# Patient Record
Sex: FEMALE | Race: BLACK OR AFRICAN AMERICAN | NOT HISPANIC OR LATINO
[De-identification: names, ages, dates, MRNs, and addresses within clinical notes are randomized per-mention and may not be internally consistent; named-entity substitution may affect disease eponyms.]

---

## 2021-09-25 ENCOUNTER — TRANSCRIPTION ENCOUNTER (OUTPATIENT)
Age: 56
End: 2021-09-25

## 2022-10-02 ENCOUNTER — NON-APPOINTMENT (OUTPATIENT)
Age: 57
End: 2022-10-02

## 2022-10-30 ENCOUNTER — FORM ENCOUNTER (OUTPATIENT)
Age: 57
End: 2022-10-30

## 2022-10-31 ENCOUNTER — APPOINTMENT (OUTPATIENT)
Dept: MRI IMAGING | Facility: CLINIC | Age: 57
End: 2022-10-31

## 2022-10-31 ENCOUNTER — APPOINTMENT (OUTPATIENT)
Dept: ORTHOPEDIC SURGERY | Facility: CLINIC | Age: 57
End: 2022-10-31

## 2022-10-31 VITALS — BODY MASS INDEX: 21.05 KG/M2 | WEIGHT: 131 LBS | HEIGHT: 66 IN

## 2022-10-31 PROBLEM — Z00.00 ENCOUNTER FOR PREVENTIVE HEALTH EXAMINATION: Status: ACTIVE | Noted: 2022-10-31

## 2022-10-31 PROCEDURE — 73221 MRI JOINT UPR EXTREM W/O DYE: CPT | Mod: LT

## 2022-10-31 PROCEDURE — 99203 OFFICE O/P NEW LOW 30 MIN: CPT

## 2022-10-31 NOTE — DATA REVIEWED
[Outside X-rays] : outside x-rays [Left] : left [Wrist] : wrist [I independently reviewed and interpreted images and report] : I independently reviewed and interpreted images and report [FreeTextEntry1] : No acute fractures or dislocations

## 2022-10-31 NOTE — HISTORY OF PRESENT ILLNESS
[de-identified] : 10/31/22:  Pt fell and now has left wrist pain and swelling.  Pt now has left shoulder pain also. [FreeTextEntry1] : Lt hand

## 2022-10-31 NOTE — IMAGING
[de-identified] : left wrist:\par mild swelling and ecchymosis\par ttp over scaphoid\par farom\par nvid\par \par left shoulder:\par pain with internal rotation\par negative impingement\par no deformity\par

## 2022-11-02 ENCOUNTER — APPOINTMENT (OUTPATIENT)
Dept: ORTHOPEDIC SURGERY | Facility: CLINIC | Age: 57
End: 2022-11-02

## 2022-11-02 VITALS — BODY MASS INDEX: 21.05 KG/M2 | WEIGHT: 131 LBS | HEIGHT: 66 IN

## 2022-11-02 DIAGNOSIS — Z78.9 OTHER SPECIFIED HEALTH STATUS: ICD-10-CM

## 2022-11-02 DIAGNOSIS — I10 ESSENTIAL (PRIMARY) HYPERTENSION: ICD-10-CM

## 2022-11-02 PROCEDURE — 25600 CLTX DST RDL FX/EPHYS SEP WO: CPT

## 2022-11-02 PROCEDURE — 99214 OFFICE O/P EST MOD 30 MIN: CPT | Mod: 25

## 2022-11-02 NOTE — ASSESSMENT
[FreeTextEntry1] : Pt will maintain spica brace x 6 weeks.\par RTO in 2 weeks for xray and exanimation of the left wrist.\par

## 2022-11-02 NOTE — IMAGING
[de-identified] : left wrist:\par mild swelling and ecchymosis\par ttp over DR and SL region\par farom\par nvid\par \par left shoulder:\par pain with internal rotation\par negative impingement\par no deformity\par

## 2022-11-02 NOTE — HISTORY OF PRESENT ILLNESS
[de-identified] : 11/2/2022: pt here s/p left wrist MRI which shows: \par 1. Findings consistent with an acute nondisplaced fracture of the distal radius and probable contusions in the \par proximal to mid dorsal scaphoid and dorsal proximal capitate with full-thickness scapholunate ligament tearing, \par effusion, synovitis and dorsal radial extensor tenosynovitis without gross malalignment or tendon discontinuity.\par 2. Moderate TFCC sprain with moderate DRUJ effusion and synovitis and radiocarpal effusion and synovitis \par with soft tissue swelling in the dorsum of the wrist extending into the hand.\par 3. Moderate chronic appearing first CMC arthrosis.\par 4. Patient motion degrades image quality on multiple sequences.\par \par MRI reviewed with Ms. Zavala and she will maintain spica brace x 6 weeks. \par \par 10/31/22:  Pt fell and now has left wrist pain and swelling.  Pt now has left shoulder pain also. [FreeTextEntry1] : Lt hand [FreeTextEntry5] : Pt is here for MRI results, she reports there are no changes since last visit.

## 2022-11-16 ENCOUNTER — APPOINTMENT (OUTPATIENT)
Dept: ORTHOPEDIC SURGERY | Facility: CLINIC | Age: 57
End: 2022-11-16

## 2022-11-16 VITALS — HEIGHT: 66 IN | WEIGHT: 131 LBS | BODY MASS INDEX: 21.05 KG/M2

## 2022-11-16 PROCEDURE — 99024 POSTOP FOLLOW-UP VISIT: CPT

## 2022-11-16 PROCEDURE — 73110 X-RAY EXAM OF WRIST: CPT | Mod: LT

## 2022-11-16 NOTE — IMAGING
[de-identified] : left wrist:\par no swelling and ecchymosis\par mild ttp over DR and SL region\par stiffness\par nvid\par \par left shoulder:\par pain with internal rotation\par negative impingement\par no deformity\par  [Left] : left wrist [The fracture is in acceptable alignment. There is progression in healing seen] : The fracture is in acceptable alignment. There is progression in healing seen

## 2022-11-16 NOTE — HISTORY OF PRESENT ILLNESS
[3] : 3 [Radiating] : radiating [] : yes [de-identified] : 11/16/22:  Pt has been wearing forearm based thumb spica and is improving.\par \par 11/2/2022: pt here s/p left wrist MRI which shows: \par 1. Findings consistent with an acute nondisplaced fracture of the distal radius and probable contusions in the \par proximal to mid dorsal scaphoid and dorsal proximal capitate with full-thickness scapholunate ligament tearing, \par effusion, synovitis and dorsal radial extensor tenosynovitis without gross malalignment or tendon discontinuity.\par 2. Moderate TFCC sprain with moderate DRUJ effusion and synovitis and radiocarpal effusion and synovitis \par with soft tissue swelling in the dorsum of the wrist extending into the hand.\par 3. Moderate chronic appearing first CMC arthrosis.\par 4. Patient motion degrades image quality on multiple sequences.\par \par MRI reviewed with Ms. Zavala and she will maintain spica brace x 6 weeks. \par \par 10/31/22:  Pt fell and now has left wrist pain and swelling.  Pt now has left shoulder pain also. [FreeTextEntry1] : left wrist  [FreeTextEntry7] : into the thumb

## 2022-11-16 NOTE — ASSESSMENT
[FreeTextEntry1] : Pt will maintain spica brace for 4 more weeks.\par RTO in 4 weeks for xray and exanimation of the left wrist.\par

## 2022-12-14 ENCOUNTER — APPOINTMENT (OUTPATIENT)
Dept: ORTHOPEDIC SURGERY | Facility: CLINIC | Age: 57
End: 2022-12-14
Payer: COMMERCIAL

## 2022-12-14 VITALS — WEIGHT: 131 LBS | BODY MASS INDEX: 21.05 KG/M2 | HEIGHT: 66 IN

## 2022-12-14 DIAGNOSIS — M75.102 UNSPECIFIED ROTATOR CUFF TEAR OR RUPTURE OF LEFT SHOULDER, NOT SPECIFIED AS TRAUMATIC: ICD-10-CM

## 2022-12-14 DIAGNOSIS — S62.002A UNSPECIFIED FRACTURE OF NAVICULAR [SCAPHOID] BONE OF LEFT WRIST, INITIAL ENCOUNTER FOR CLOSED FRACTURE: ICD-10-CM

## 2022-12-14 DIAGNOSIS — S52.552D OTHER EXTRAARTICULAR FRACTURE OF LOWER END OF LEFT RADIUS, SUBSEQUENT ENCOUNTER FOR CLOSED FRACTURE WITH ROUTINE HEALING: ICD-10-CM

## 2022-12-14 PROCEDURE — 99024 POSTOP FOLLOW-UP VISIT: CPT

## 2022-12-14 PROCEDURE — 73110 X-RAY EXAM OF WRIST: CPT | Mod: LT

## 2022-12-14 NOTE — IMAGING
[de-identified] : left wrist:\par no swelling and ecchymosis\par mild ttp over DR and SL region\par stiffness\par nvid\par \par left shoulder:\par pain with internal rotation\par negative impingement\par no deformity\par  [Left] : left wrist [The fracture is in acceptable alignment. There is progression in healing seen] : The fracture is in acceptable alignment. There is progression in healing seen

## 2022-12-14 NOTE — HISTORY OF PRESENT ILLNESS
[0] : 0 [de-identified] : 12/14/22:  Pt has minimal pain\par \par 11/16/22:  Pt has been wearing forearm based thumb spica and is improving.\par \par 11/2/2022: pt here s/p left wrist MRI which shows: \par 1. Findings consistent with an acute nondisplaced fracture of the distal radius and probable contusions in the \par proximal to mid dorsal scaphoid and dorsal proximal capitate with full-thickness scapholunate ligament tearing, \par effusion, synovitis and dorsal radial extensor tenosynovitis without gross malalignment or tendon discontinuity.\par 2. Moderate TFCC sprain with moderate DRUJ effusion and synovitis and radiocarpal effusion and synovitis \par with soft tissue swelling in the dorsum of the wrist extending into the hand.\par 3. Moderate chronic appearing first CMC arthrosis.\par 4. Patient motion degrades image quality on multiple sequences.\par \par MRI reviewed with Ms. Zavala and she will maintain spica brace x 6 weeks. \par \par 10/31/22:  Pt fell and now has left wrist pain and swelling.  Pt now has left shoulder pain also. [FreeTextEntry5] : still bracing, she feels increased pain without it  [FreeTextEntry1] : left wrist

## 2023-01-04 ENCOUNTER — APPOINTMENT (OUTPATIENT)
Dept: ORTHOPEDIC SURGERY | Facility: CLINIC | Age: 58
End: 2023-01-04

## 2023-08-28 ENCOUNTER — ASOB RESULT (OUTPATIENT)
Age: 58
End: 2023-08-28

## 2023-08-28 ENCOUNTER — APPOINTMENT (OUTPATIENT)
Dept: MATERNAL FETAL MEDICINE | Facility: CLINIC | Age: 58
End: 2023-08-28
Payer: COMMERCIAL

## 2023-08-28 PROCEDURE — 99202 OFFICE O/P NEW SF 15 MIN: CPT | Mod: 95

## 2023-09-25 ENCOUNTER — APPOINTMENT (OUTPATIENT)
Dept: ANTEPARTUM | Facility: CLINIC | Age: 58
End: 2023-09-25
Payer: COMMERCIAL

## 2023-09-25 DIAGNOSIS — Z13.71 ENCOUNTER FOR NONPROCREATIVE SCREENING FOR GENETIC DISEASE CARRIER STATUS: ICD-10-CM

## 2023-09-25 PROCEDURE — 36415 COLL VENOUS BLD VENIPUNCTURE: CPT

## 2023-10-16 ENCOUNTER — APPOINTMENT (OUTPATIENT)
Dept: MATERNAL FETAL MEDICINE | Facility: CLINIC | Age: 58
End: 2023-10-16
Payer: COMMERCIAL

## 2023-10-16 ENCOUNTER — ASOB RESULT (OUTPATIENT)
Age: 58
End: 2023-10-16

## 2023-10-16 PROCEDURE — 99212 OFFICE O/P EST SF 10 MIN: CPT | Mod: 95

## 2023-10-27 ENCOUNTER — NON-APPOINTMENT (OUTPATIENT)
Age: 58
End: 2023-10-27